# Patient Record
Sex: FEMALE | Race: WHITE | NOT HISPANIC OR LATINO
[De-identification: names, ages, dates, MRNs, and addresses within clinical notes are randomized per-mention and may not be internally consistent; named-entity substitution may affect disease eponyms.]

---

## 2017-02-06 ENCOUNTER — HOSPITAL (OUTPATIENT)
Dept: OTHER | Age: 72
End: 2017-02-06
Attending: INTERNAL MEDICINE

## 2017-02-06 LAB
FREE T4: 1 NG/DL (ref 0.8–1.5)
TSH SERPL-ACNC: 8.78 MCUNIT/ML (ref 0.35–5)

## 2017-03-14 PROBLEM — M75.02 ADHESIVE CAPSULITIS OF LEFT SHOULDER: Status: ACTIVE | Noted: 2017-03-14

## 2017-04-10 ENCOUNTER — HOSPITAL (OUTPATIENT)
Dept: OTHER | Age: 72
End: 2017-04-10
Attending: INTERNAL MEDICINE

## 2017-04-10 LAB
FREE T4: 0.9 NG/DL (ref 0.8–1.5)
TSH SERPL-ACNC: 23.4 MCUNIT/ML (ref 0.35–5)

## 2017-06-21 ENCOUNTER — HOSPITAL (OUTPATIENT)
Dept: OTHER | Age: 72
End: 2017-06-21
Attending: INTERNAL MEDICINE

## 2017-06-21 LAB
FREE T4: 1.9 NG/DL (ref 0.8–1.5)
TSH SERPL-ACNC: 6.11 MCUNIT/ML (ref 0.35–5)

## 2017-06-25 ENCOUNTER — APPOINTMENT (OUTPATIENT)
Dept: GENERAL RADIOLOGY | Facility: HOSPITAL | Age: 72
End: 2017-06-25
Attending: EMERGENCY MEDICINE
Payer: MEDICARE

## 2017-06-25 ENCOUNTER — HOSPITAL ENCOUNTER (EMERGENCY)
Facility: HOSPITAL | Age: 72
Discharge: ANOTHER HOSPITAL | End: 2017-06-25
Attending: EMERGENCY MEDICINE
Payer: MEDICARE

## 2017-06-25 VITALS
SYSTOLIC BLOOD PRESSURE: 88 MMHG | DIASTOLIC BLOOD PRESSURE: 52 MMHG | RESPIRATION RATE: 24 BRPM | HEIGHT: 63 IN | HEART RATE: 90 BPM | WEIGHT: 85 LBS | TEMPERATURE: 98 F | OXYGEN SATURATION: 94 % | BODY MASS INDEX: 15.06 KG/M2

## 2017-06-25 DIAGNOSIS — I95.9 HYPOTENSION, UNSPECIFIED HYPOTENSION TYPE: ICD-10-CM

## 2017-06-25 DIAGNOSIS — E87.1 HYPONATREMIA: ICD-10-CM

## 2017-06-25 DIAGNOSIS — J18.9 COMMUNITY ACQUIRED PNEUMONIA: Primary | ICD-10-CM

## 2017-06-25 PROCEDURE — 36415 COLL VENOUS BLD VENIPUNCTURE: CPT

## 2017-06-25 PROCEDURE — 83605 ASSAY OF LACTIC ACID: CPT | Performed by: EMERGENCY MEDICINE

## 2017-06-25 PROCEDURE — 99285 EMERGENCY DEPT VISIT HI MDM: CPT

## 2017-06-25 PROCEDURE — 85025 COMPLETE CBC W/AUTO DIFF WBC: CPT | Performed by: EMERGENCY MEDICINE

## 2017-06-25 PROCEDURE — 87040 BLOOD CULTURE FOR BACTERIA: CPT | Performed by: EMERGENCY MEDICINE

## 2017-06-25 PROCEDURE — 81003 URINALYSIS AUTO W/O SCOPE: CPT | Performed by: EMERGENCY MEDICINE

## 2017-06-25 PROCEDURE — 96361 HYDRATE IV INFUSION ADD-ON: CPT

## 2017-06-25 PROCEDURE — 70360 X-RAY EXAM OF NECK: CPT | Performed by: EMERGENCY MEDICINE

## 2017-06-25 PROCEDURE — 85610 PROTHROMBIN TIME: CPT | Performed by: EMERGENCY MEDICINE

## 2017-06-25 PROCEDURE — 96365 THER/PROPH/DIAG IV INF INIT: CPT

## 2017-06-25 PROCEDURE — 80053 COMPREHEN METABOLIC PANEL: CPT | Performed by: EMERGENCY MEDICINE

## 2017-06-25 PROCEDURE — 71020 XR CHEST PA + LAT CHEST (CPT=71020): CPT | Performed by: EMERGENCY MEDICINE

## 2017-06-25 RX ORDER — HYDROCODONE BITARTRATE AND ACETAMINOPHEN 5; 325 MG/1; MG/1
1 TABLET ORAL ONCE
Status: COMPLETED | OUTPATIENT
Start: 2017-06-25 | End: 2017-06-25

## 2017-06-25 RX ORDER — SODIUM CHLORIDE 9 MG/ML
INJECTION, SOLUTION INTRAVENOUS ONCE
Status: COMPLETED | OUTPATIENT
Start: 2017-06-25 | End: 2017-06-25

## 2017-06-25 RX ORDER — ACETAMINOPHEN 500 MG
TABLET ORAL
Status: COMPLETED
Start: 2017-06-25 | End: 2017-06-25

## 2017-06-25 NOTE — ED PROVIDER NOTES
Patient Seen in: [facility] Emergency Department    History   Patient presents with:  Fever (infectious)  Cough/URI    Stated Complaint: chemo, fever cough    HPI    72-year-old white female presents emerged from today for complaint of fever and cou Take 1 tablet by mouth daily. Ondansetron HCl (ZOFRAN) 8 MG Oral Tab,  Take 8 mg by mouth every 8 (eight) hours as needed for Nausea (during chemo). Alendronate Sodium (FOSAMAX) 70 MG Oral Tab,  Take 70 mg by mouth once a week.    Levothyroxine Sodium in HPI.     Physical Exam   ED Triage Vitals  BP: (!) 83/61 [06/25/17 1408]  Pulse: 100 [06/25/17 1408]  Resp: 18 [06/25/17 1408]  Temp: (!) 97.3 °F (36.3 °C) [06/25/17 1410]  Temp src: Oral [06/25/17 1410]  SpO2: 100 % [06/25/17 1408]  O2 Device: None [facility] M[facility] - Abnormal; Notable for the following:     RBC Morphology See morphology below (*)     Platelet Morphology See morphology below (*)     Hypochromia Small (*)     Microcytosis Small (*)     Macrocytosis Small (*)     Spherocyte Small (*) excluded.      Impression     CONCLUSION:    1. Consolidative airspace opacity seen within the right upper lobe with findings concerning for cavitary component/abscess.   2. Airspace opacities in the right lung base is also seen as well as a small to [employer] blood pressure 119/91. Patient was transferred to the [facility] in stable condition.         Disposition and Plan     Clinical Impression:  Community acquired pneumonia  (primary encounter diagnosis)  Hypotension, unspecified hypotension type    D

## 2017-06-25 NOTE — CONSULTS
[facility] Pharmacy Note:  Renal Adjustment for Merrem (meropenem)    Idania Brady is a 73year old female who has been prescribed Merrem (meropenem) 1 g X 1 dose. CrCl is estimated creatinine clearance is 31.7 mL/min (based on SCr of 1.02 mg/dL).  so the dose h

## 2017-06-25 NOTE — ED INITIAL ASSESSMENT (HPI)
Pt was sent in for further evaluation of hypotension and fever. Pt only wants her vitals done to see if they are still low. Pt currently being treated at [address]. Pt did not want to get undressed.  Pt would like

## 2017-06-25 NOTE — ED NOTES
Pt and son calling their dr at [facility] to see if they should be evaluated here or just drive to [facility]

## 2017-06-26 NOTE — ED NOTES
[facility] at bedside for transport. Forest Duncan, son at bedside, aware of [facility] room #.  Patient took cell phone and purse with her in ambulance

## 2017-06-26 NOTE — ED NOTES
Ellie Corona from [facility] telephoned with room assignment. Son Naz Marquis and patient updated.

## 2017-06-26 NOTE — ED NOTES
Report phoned to [facility] at [address]. Pt assigned to room 8010.  Awaiting ambulance arrival